# Patient Record
Sex: MALE | Race: WHITE | NOT HISPANIC OR LATINO | Employment: FULL TIME | URBAN - METROPOLITAN AREA
[De-identification: names, ages, dates, MRNs, and addresses within clinical notes are randomized per-mention and may not be internally consistent; named-entity substitution may affect disease eponyms.]

---

## 2019-10-01 PROBLEM — Z00.00 ENCOUNTER FOR GENERAL ADULT MEDICAL EXAMINATION WITHOUT ABNORMAL FINDINGS: Status: ACTIVE | Noted: 2018-05-16

## 2019-10-01 PROBLEM — D72.819 LEUKOPENIA: Status: ACTIVE | Noted: 2018-05-22

## 2019-10-03 ENCOUNTER — OFFICE VISIT (OUTPATIENT)
Dept: FAMILY MEDICINE CLINIC | Facility: CLINIC | Age: 54
End: 2019-10-03

## 2019-10-03 VITALS
HEIGHT: 76 IN | RESPIRATION RATE: 16 BRPM | BODY MASS INDEX: 25.33 KG/M2 | WEIGHT: 208 LBS | SYSTOLIC BLOOD PRESSURE: 142 MMHG | DIASTOLIC BLOOD PRESSURE: 85 MMHG | HEART RATE: 109 BPM | OXYGEN SATURATION: 99 % | TEMPERATURE: 98 F

## 2019-10-03 DIAGNOSIS — Z00.00 ENCOUNTER FOR GENERAL ADULT MEDICAL EXAMINATION WITHOUT ABNORMAL FINDINGS: Primary | ICD-10-CM

## 2019-10-03 DIAGNOSIS — R07.89 CHEST PAIN, ATYPICAL: ICD-10-CM

## 2019-10-03 DIAGNOSIS — Z23 NEED FOR IMMUNIZATION AGAINST INFLUENZA: ICD-10-CM

## 2019-10-03 LAB
25(OH)D3 SERPL-MCNC: 38.2 NG/ML (ref 30–100)
ALBUMIN SERPL-MCNC: 4.3 G/DL (ref 3.5–4.8)
ALBUMIN/GLOB SERPL: 1.6 G/DL (ref 1–1.7)
ALP SERPL-CCNC: 56 U/L (ref 32–91)
ALT SERPL W P-5'-P-CCNC: 35 U/L (ref 17–63)
ANION GAP SERPL CALCULATED.3IONS-SCNC: 13.4 MMOL/L (ref 5–15)
ARTICHOKE IGE QN: 150 MG/DL (ref 0–100)
AST SERPL-CCNC: 23 U/L (ref 15–41)
BASOPHILS # BLD AUTO: 0 10*3/MM3 (ref 0–0.2)
BASOPHILS NFR BLD AUTO: 0.7 % (ref 0–1.5)
BILIRUB SERPL-MCNC: 0.8 MG/DL (ref 0.3–1.2)
BILIRUB UR QL STRIP: ABNORMAL
BUN BLD-MCNC: 15 MG/DL (ref 8–20)
BUN/CREAT SERPL: 12.5 (ref 6.2–20.3)
CALCIUM SPEC-SCNC: 9.8 MG/DL (ref 8.9–10.3)
CHLORIDE SERPL-SCNC: 104 MMOL/L (ref 101–111)
CHOLEST SERPL-MCNC: 220 MG/DL
CLARITY UR: ABNORMAL
CO2 SERPL-SCNC: 27 MMOL/L (ref 22–32)
COLOR UR: ABNORMAL
CREAT BLD-MCNC: 1.2 MG/DL (ref 0.7–1.2)
DEPRECATED RDW RBC AUTO: 41.6 FL (ref 37–54)
EOSINOPHIL # BLD AUTO: 0.1 10*3/MM3 (ref 0–0.4)
EOSINOPHIL NFR BLD AUTO: 1.6 % (ref 0.3–6.2)
ERYTHROCYTE [DISTWIDTH] IN BLOOD BY AUTOMATED COUNT: 13.7 % (ref 12.3–15.4)
GFR SERPL CREATININE-BSD FRML MDRD: 63 ML/MIN/1.73
GLOBULIN UR ELPH-MCNC: 2.7 GM/DL (ref 2.5–3.8)
GLUCOSE BLD-MCNC: 92 MG/DL (ref 65–99)
GLUCOSE UR STRIP-MCNC: NEGATIVE MG/DL
HCT VFR BLD AUTO: 45.9 % (ref 37.5–51)
HDLC SERPL QL: 3.86
HDLC SERPL-MCNC: 57 MG/DL
HGB BLD-MCNC: 15.5 G/DL (ref 13–17.7)
HGB UR QL STRIP.AUTO: NEGATIVE
KETONES UR QL STRIP: NEGATIVE
LDLC/HDLC SERPL: 2.56 {RATIO}
LEUKOCYTE ESTERASE UR QL STRIP.AUTO: NEGATIVE
LYMPHOCYTES # BLD AUTO: 1.6 10*3/MM3 (ref 0.7–3.1)
LYMPHOCYTES NFR BLD AUTO: 28.4 % (ref 19.6–45.3)
MCH RBC QN AUTO: 29.3 PG (ref 26.6–33)
MCHC RBC AUTO-ENTMCNC: 33.8 G/DL (ref 31.5–35.7)
MCV RBC AUTO: 86.8 FL (ref 79–97)
MONOCYTES # BLD AUTO: 0.5 10*3/MM3 (ref 0.1–0.9)
MONOCYTES NFR BLD AUTO: 9.2 % (ref 5–12)
NEUTROPHILS # BLD AUTO: 3.4 10*3/MM3 (ref 1.7–7)
NEUTROPHILS NFR BLD AUTO: 60.1 % (ref 42.7–76)
NITRITE UR QL STRIP: NEGATIVE
NRBC BLD AUTO-RTO: 0.2 /100 WBC (ref 0–0.2)
PH UR STRIP.AUTO: 5.5 [PH] (ref 5–8)
PLATELET # BLD AUTO: 275 10*3/MM3 (ref 140–450)
PMV BLD AUTO: 10.3 FL (ref 6–12)
POTASSIUM BLD-SCNC: 4.4 MMOL/L (ref 3.6–5.1)
PROT SERPL-MCNC: 7 G/DL (ref 6.1–7.9)
PROT UR QL STRIP: NEGATIVE
PSA SERPL-MCNC: 1.66 NG/ML (ref 0–4)
RBC # BLD AUTO: 5.28 10*6/MM3 (ref 4.14–5.8)
SODIUM BLD-SCNC: 140 MMOL/L (ref 136–144)
SP GR UR STRIP: >=1.03 (ref 1–1.03)
TRIGL SERPL-MCNC: 86 MG/DL
TROPONIN I SERPL-MCNC: <0.03 NG/ML (ref 0–0.03)
TSH SERPL DL<=0.05 MIU/L-ACNC: 1.45 UIU/ML (ref 0.34–5.6)
UROBILINOGEN UR QL STRIP: ABNORMAL
VIT B12 BLD-MCNC: 160 PG/ML (ref 180–914)
VLDLC SERPL-MCNC: 17.2 MG/DL
WBC NRBC COR # BLD: 5.6 10*3/MM3 (ref 3.4–10.8)

## 2019-10-03 PROCEDURE — 82607 VITAMIN B-12: CPT | Performed by: FAMILY MEDICINE

## 2019-10-03 PROCEDURE — 81003 URINALYSIS AUTO W/O SCOPE: CPT | Performed by: FAMILY MEDICINE

## 2019-10-03 PROCEDURE — G0103 PSA SCREENING: HCPCS | Performed by: FAMILY MEDICINE

## 2019-10-03 PROCEDURE — 84443 ASSAY THYROID STIM HORMONE: CPT | Performed by: FAMILY MEDICINE

## 2019-10-03 PROCEDURE — 90471 IMMUNIZATION ADMIN: CPT | Performed by: FAMILY MEDICINE

## 2019-10-03 PROCEDURE — 80061 LIPID PANEL: CPT | Performed by: FAMILY MEDICINE

## 2019-10-03 PROCEDURE — 36415 COLL VENOUS BLD VENIPUNCTURE: CPT | Performed by: FAMILY MEDICINE

## 2019-10-03 PROCEDURE — 90674 CCIIV4 VAC NO PRSV 0.5 ML IM: CPT | Performed by: FAMILY MEDICINE

## 2019-10-03 PROCEDURE — 82306 VITAMIN D 25 HYDROXY: CPT | Performed by: FAMILY MEDICINE

## 2019-10-03 PROCEDURE — 84484 ASSAY OF TROPONIN QUANT: CPT | Performed by: FAMILY MEDICINE

## 2019-10-03 PROCEDURE — 93000 ELECTROCARDIOGRAM COMPLETE: CPT | Performed by: FAMILY MEDICINE

## 2019-10-03 PROCEDURE — 99396 PREV VISIT EST AGE 40-64: CPT | Performed by: FAMILY MEDICINE

## 2019-10-03 PROCEDURE — 80053 COMPREHEN METABOLIC PANEL: CPT | Performed by: FAMILY MEDICINE

## 2019-10-03 PROCEDURE — 85025 COMPLETE CBC W/AUTO DIFF WBC: CPT | Performed by: FAMILY MEDICINE

## 2019-10-03 RX ORDER — PREDNISONE 20 MG/1
20 TABLET ORAL DAILY
Qty: 10 TABLET | Refills: 0 | Status: SHIPPED | OUTPATIENT
Start: 2019-10-03 | End: 2020-06-01

## 2019-10-03 NOTE — PROGRESS NOTES
Subjective   Chief Complaint   Patient presents with   • Annual Exam     Steven Todd Sturgeon is a 54 y.o. male.     Patient Care Team:  Phyllis Frost MD as PCP - General (Family Medicine)    He is coming in today for his annual wellness exam.  He reports that for the last 2 weeks or so he has noted some numbness and tingling sensation on the left upper chest, which happens only at night with certain position of the shoulder and arm.  He has tried over-the-counter anti-inflammatories and those are giving him relief.  Today he did not take any and he feels the sensation lipid more.  He denies any chest pains or shortness of breath.  He denies any dizziness, nausea, vomiting, diarrhea, or constipation.  He has pretty healthy lifestyle and stays pretty active.  He is trying to eat healthy.  He denies any family history of heart issues, besides in his father later in life however he was a heavy smoker for GERD for quite some time.  He denies any urinary problems.  He does not take any medications.         The following portions of the patient's history were reviewed and updated as appropriate: allergies, current medications, past family history, past medical history, past social history, past surgical history and problem list.  Past Medical History:   Diagnosis Date   • Hyperlipidemia      Past Surgical History:   Procedure Laterality Date   • COLONOSCOPY      refusing; negative cologuard 6/4/2018   • TONSILLECTOMY     • VOCAL CORD STRIPPING       The patient has a family history of  Family History   Problem Relation Age of Onset   • COPD Father    • Hypertension Father    • Hyperlipidemia Father    • Diabetes Father    • Heart disease Father      Social History     Socioeconomic History   • Marital status:      Spouse name: Not on file   • Number of children: Not on file   • Years of education: Not on file   • Highest education level: Not on file   Tobacco Use   • Smoking status: Never Smoker   • Smokeless  "tobacco: Never Used   Substance and Sexual Activity   • Alcohol use: No     Frequency: Never   • Drug use: No   • Sexual activity: Defer       Review of Systems   Constitutional: Negative for activity change, appetite change, chills, fatigue, fever, unexpected weight gain and unexpected weight loss.   HENT: Negative for congestion, ear pain, hearing loss, nosebleeds, postnasal drip, swollen glands, tinnitus and trouble swallowing.    Eyes: Negative for blurred vision, double vision and visual disturbance.   Respiratory: Negative for cough, choking, chest tightness, shortness of breath, wheezing and stridor.    Cardiovascular: Negative for chest pain, palpitations and leg swelling.        He reports some atypical chest sensations.   Gastrointestinal: Negative for abdominal distention, abdominal pain, anal bleeding, constipation, diarrhea, nausea, vomiting and GERD.   Endocrine: Negative for cold intolerance, heat intolerance and polyphagia.   Genitourinary: Negative for dysuria, flank pain, frequency, hematuria and urgency.   Musculoskeletal: Negative for arthralgias, back pain, gait problem, joint swelling and neck pain.   Skin: Negative for color change, dry skin and skin lesions.   Allergic/Immunologic: Negative for environmental allergies and food allergies.   Neurological: Negative for dizziness, tremors, speech difficulty, light-headedness, numbness, headache and confusion.   Hematological: Negative for adenopathy. Does not bruise/bleed easily.   Psychiatric/Behavioral: Negative for decreased concentration, sleep disturbance, depressed mood and stress.     Visit Vitals  /85 (BP Location: Right arm, Patient Position: Sitting, Cuff Size: Adult)   Pulse 109   Temp 98 °F (36.7 °C) (Oral)   Resp 16   Ht 193 cm (76\")   Wt 94.3 kg (208 lb)   SpO2 99%   BMI 25.32 kg/m²       Current Outpatient Medications:   •  predniSONE (DELTASONE) 20 MG tablet, Take 1 tablet by mouth Daily., Disp: 10 tablet, Rfl: " 0    Objective   Physical Exam   Constitutional: He is oriented to person, place, and time. He appears well-developed and well-nourished. No distress.   HENT:   Head: Normocephalic and atraumatic.   Right Ear: External ear normal.   Left Ear: External ear normal.   Mouth/Throat: Oropharynx is clear and moist.   Eyes: Conjunctivae and EOM are normal. Pupils are equal, round, and reactive to light. Right eye exhibits no discharge. Left eye exhibits no discharge. No scleral icterus.   Neck: Normal range of motion. Neck supple. No JVD present. No thyromegaly present.   Cardiovascular: Normal rate, regular rhythm, normal heart sounds and intact distal pulses. Exam reveals no gallop and no friction rub.   No murmur heard.  Pulmonary/Chest: Effort normal and breath sounds normal. No respiratory distress. He has no wheezes. He has no rales.   Abdominal: Soft. Bowel sounds are normal. He exhibits no distension and no mass. There is no tenderness. There is no rebound and no guarding. No hernia.   Musculoskeletal: Normal range of motion. He exhibits no edema, tenderness or deformity.   Lymphadenopathy:     He has no cervical adenopathy.   Neurological: He is alert and oriented to person, place, and time. He displays normal reflexes. No cranial nerve deficit or sensory deficit.   Skin: Skin is warm and dry. Capillary refill takes less than 2 seconds. No rash noted. He is not diaphoretic. No erythema. No pallor.   Psychiatric: He has a normal mood and affect. His behavior is normal. Judgment normal.   Nursing note and vitals reviewed.        ECG 12 Lead  Date/Time: 10/3/2019 10:28 AM  Performed by: Phyllis Frost MD  Authorized by: Phyllis Frost MD   Comparison: not compared with previous ECG   Rhythm: sinus rhythm  Rate: normal  Conduction: conduction normal  ST Segments: ST segments normal  T Waves: T waves normal  QRS axis: normal  Other: no other findings    Clinical impression: normal ECG            Assessment/Plan    Problems Addressed this Visit        Other    Encounter for general adult medical examination without abnormal findings - Primary    Relevant Orders    CBC Auto Differential    Comprehensive Metabolic Panel    Lipid Panel    PSA Screen    Urinalysis With Culture If Indicated - Urine, Clean Catch    Vitamin B12    Vitamin D 25 Hydroxy    TSH      Other Visit Diagnoses     Chest pain, atypical        Relevant Orders    ECG 12 Lead    Troponin    Need for immunization against influenza        Relevant Orders    Flucelvax Quad=>4Years ([id number]) (Completed)        Wellness exam was done today - see above for details. Healthy life style was reviewed and discussed and re-enforced. Regular exercise and healthy diet were also discussed and recommended.  I will be getting fasting blood work.  If it comes to his chest sensations does seem to be pretty atypical and sounds more like radiculopathy.  EKG was done today and it did not show any acute changes.  I will start him on a course of the steroid to see if that will give him the relief.  He was advised to call us back if symptoms continue.  He has never had colonoscopy due to refusal, but he had negative Cologuard and June 2018.  He was given a flu shot today.  We will also discussed Shingrix, which was recommended and he is to check with his pharmacy.               Requested Prescriptions     Signed Prescriptions Disp Refills   • predniSONE (DELTASONE) 20 MG tablet 10 tablet 0     Sig: Take 1 tablet by mouth Daily.

## 2019-10-14 ENCOUNTER — CLINICAL SUPPORT (OUTPATIENT)
Dept: FAMILY MEDICINE CLINIC | Facility: CLINIC | Age: 54
End: 2019-10-14

## 2019-10-14 DIAGNOSIS — E53.8 B12 DEFICIENCY: Primary | ICD-10-CM

## 2019-10-14 PROCEDURE — 96372 THER/PROPH/DIAG INJ SC/IM: CPT | Performed by: FAMILY MEDICINE

## 2019-10-14 RX ORDER — CYANOCOBALAMIN 1000 UG/ML
1000 INJECTION, SOLUTION INTRAMUSCULAR; SUBCUTANEOUS ONCE
Status: COMPLETED | OUTPATIENT
Start: 2019-10-14 | End: 2019-10-14

## 2019-10-14 RX ADMIN — CYANOCOBALAMIN 1000 MCG: 1000 INJECTION, SOLUTION INTRAMUSCULAR; SUBCUTANEOUS at 10:48

## 2019-10-15 ENCOUNTER — CLINICAL SUPPORT (OUTPATIENT)
Dept: FAMILY MEDICINE CLINIC | Facility: CLINIC | Age: 54
End: 2019-10-15

## 2019-10-15 DIAGNOSIS — E53.8 B12 DEFICIENCY: Primary | ICD-10-CM

## 2019-10-15 PROCEDURE — 96372 THER/PROPH/DIAG INJ SC/IM: CPT | Performed by: FAMILY MEDICINE

## 2019-10-15 RX ORDER — CYANOCOBALAMIN 1000 UG/ML
1000 INJECTION, SOLUTION INTRAMUSCULAR; SUBCUTANEOUS ONCE
Status: COMPLETED | OUTPATIENT
Start: 2019-10-15 | End: 2019-10-15

## 2019-10-15 RX ADMIN — CYANOCOBALAMIN 1000 MCG: 1000 INJECTION, SOLUTION INTRAMUSCULAR; SUBCUTANEOUS at 10:58

## 2019-10-16 ENCOUNTER — CLINICAL SUPPORT (OUTPATIENT)
Dept: FAMILY MEDICINE CLINIC | Facility: CLINIC | Age: 54
End: 2019-10-16

## 2019-10-16 DIAGNOSIS — E53.8 B12 DEFICIENCY: Primary | ICD-10-CM

## 2019-10-16 PROCEDURE — 96372 THER/PROPH/DIAG INJ SC/IM: CPT | Performed by: FAMILY MEDICINE

## 2019-10-16 RX ORDER — CYANOCOBALAMIN 1000 UG/ML
1000 INJECTION, SOLUTION INTRAMUSCULAR; SUBCUTANEOUS ONCE
Status: COMPLETED | OUTPATIENT
Start: 2019-10-16 | End: 2019-10-16

## 2019-10-16 RX ADMIN — CYANOCOBALAMIN 1000 MCG: 1000 INJECTION, SOLUTION INTRAMUSCULAR; SUBCUTANEOUS at 11:55

## 2019-10-23 ENCOUNTER — CLINICAL SUPPORT (OUTPATIENT)
Dept: FAMILY MEDICINE CLINIC | Facility: CLINIC | Age: 54
End: 2019-10-23

## 2019-10-23 DIAGNOSIS — E53.8 B12 DEFICIENCY: Primary | ICD-10-CM

## 2019-10-23 PROCEDURE — 96372 THER/PROPH/DIAG INJ SC/IM: CPT | Performed by: FAMILY MEDICINE

## 2019-10-23 RX ORDER — CYANOCOBALAMIN 1000 UG/ML
1000 INJECTION, SOLUTION INTRAMUSCULAR; SUBCUTANEOUS ONCE
Status: COMPLETED | OUTPATIENT
Start: 2019-10-23 | End: 2019-10-23

## 2019-10-23 RX ADMIN — CYANOCOBALAMIN 1000 MCG: 1000 INJECTION, SOLUTION INTRAMUSCULAR; SUBCUTANEOUS at 10:58

## 2019-10-30 ENCOUNTER — CLINICAL SUPPORT (OUTPATIENT)
Dept: FAMILY MEDICINE CLINIC | Facility: CLINIC | Age: 54
End: 2019-10-30

## 2019-10-30 DIAGNOSIS — E53.8 B12 DEFICIENCY: Primary | ICD-10-CM

## 2019-10-30 PROCEDURE — 96372 THER/PROPH/DIAG INJ SC/IM: CPT | Performed by: FAMILY MEDICINE

## 2019-10-30 RX ORDER — CYANOCOBALAMIN 1000 UG/ML
1000 INJECTION, SOLUTION INTRAMUSCULAR; SUBCUTANEOUS ONCE
Status: COMPLETED | OUTPATIENT
Start: 2019-10-30 | End: 2019-10-30

## 2019-10-30 RX ADMIN — CYANOCOBALAMIN 1000 MCG: 1000 INJECTION, SOLUTION INTRAMUSCULAR; SUBCUTANEOUS at 11:03

## 2020-06-01 ENCOUNTER — OFFICE VISIT (OUTPATIENT)
Dept: FAMILY MEDICINE CLINIC | Facility: CLINIC | Age: 55
End: 2020-06-01

## 2020-06-01 VITALS
TEMPERATURE: 97.3 F | DIASTOLIC BLOOD PRESSURE: 76 MMHG | SYSTOLIC BLOOD PRESSURE: 146 MMHG | HEIGHT: 76 IN | HEART RATE: 91 BPM | OXYGEN SATURATION: 97 % | BODY MASS INDEX: 26.18 KG/M2 | RESPIRATION RATE: 16 BRPM | WEIGHT: 215 LBS

## 2020-06-01 DIAGNOSIS — R07.89 OTHER CHEST PAIN: Primary | ICD-10-CM

## 2020-06-01 DIAGNOSIS — M25.512 CHRONIC LEFT SHOULDER PAIN: ICD-10-CM

## 2020-06-01 DIAGNOSIS — G89.29 CHRONIC LEFT SHOULDER PAIN: ICD-10-CM

## 2020-06-01 DIAGNOSIS — E53.8 VITAMIN B12 DEFICIENCY: ICD-10-CM

## 2020-06-01 PROCEDURE — 85025 COMPLETE CBC W/AUTO DIFF WBC: CPT | Performed by: FAMILY MEDICINE

## 2020-06-01 PROCEDURE — 84484 ASSAY OF TROPONIN QUANT: CPT | Performed by: FAMILY MEDICINE

## 2020-06-01 PROCEDURE — 80053 COMPREHEN METABOLIC PANEL: CPT | Performed by: FAMILY MEDICINE

## 2020-06-01 PROCEDURE — 36415 COLL VENOUS BLD VENIPUNCTURE: CPT | Performed by: FAMILY MEDICINE

## 2020-06-01 PROCEDURE — 82607 VITAMIN B-12: CPT | Performed by: FAMILY MEDICINE

## 2020-06-01 PROCEDURE — 93000 ELECTROCARDIOGRAM COMPLETE: CPT | Performed by: FAMILY MEDICINE

## 2020-06-01 PROCEDURE — 99214 OFFICE O/P EST MOD 30 MIN: CPT | Performed by: FAMILY MEDICINE

## 2020-06-01 RX ORDER — SILDENAFIL CITRATE 20 MG/1
TABLET ORAL
COMMUNITY
Start: 2020-05-15

## 2020-06-01 RX ORDER — METHYLPREDNISOLONE 4 MG/1
TABLET ORAL
Qty: 1 EACH | Refills: 0 | Status: SHIPPED | OUTPATIENT
Start: 2020-06-01

## 2020-06-01 NOTE — PROGRESS NOTES
Subjective   Chief Complaint   Patient presents with   • Chest Pain   • Shoulder Pain     Steven Todd Sturgeon is a 55 y.o. male.     Patient Care Team:  Phyllis Frost MD as PCP - General (Family Medicine)    He is coming in today due to on and off chest pain and left shoulder pain, which she has been dealing with for over 6 months.  He reports that the pain is in the left upper chest and feels like discomfort and gets actually aggravated with certain position of his arm and shoulder.  He noted more discomfort at night when he lies on the left side and moves the shoulder and arm in certain direction.  He denies any numbness or tingling.  He denies any trauma.  He denies any difficulty breathing.  He is still able to do all what he wants to do physically and does not have to stop due to increased chest or shoulder pain.  He also wants to talk about his not very healthy lifestyle, which he has been having for the last several months.  He has gained some weight in a week ago he decided to start working more on his diet.  He started eating healthier and stop drinking alcohol altogether.  Since then he has lost 4 pounds and he actually feels much better than he did ever before.  He denies any rashes in the area.  He denies any nausea, vomiting, or diarrhea.  He denies any fevers.  He reports that his uncle when he was about his age sustained a stroke and that really is concerning to him and he wants to be checked.       The following portions of the patient's history were reviewed and updated as appropriate: allergies, current medications, past family history, past medical history, past social history, past surgical history and problem list.  Past Medical History:   Diagnosis Date   • Hyperlipidemia    • Vitamin B12 deficiency      Past Surgical History:   Procedure Laterality Date   • COLONOSCOPY      refusing; negative cologuard 6/4/2018   • TONSILLECTOMY     • VOCAL CORD STRIPPING       The patient has a family  "history of  Family History   Problem Relation Age of Onset   • COPD Father    • Hypertension Father    • Hyperlipidemia Father    • Diabetes Father    • Heart disease Father      Social History     Socioeconomic History   • Marital status:      Spouse name: Not on file   • Number of children: Not on file   • Years of education: Not on file   • Highest education level: Not on file   Tobacco Use   • Smoking status: Never Smoker   • Smokeless tobacco: Never Used   Substance and Sexual Activity   • Alcohol use: No     Frequency: Never   • Drug use: No   • Sexual activity: Defer       Review of Systems   Constitutional: Negative for activity change, fatigue and fever.   Respiratory: Positive for chest tightness. Negative for cough, shortness of breath and wheezing.    Cardiovascular: Positive for chest pain. Negative for palpitations and leg swelling.   Gastrointestinal: Negative for constipation, diarrhea and indigestion.   Musculoskeletal: Positive for arthralgias. Negative for back pain, gait problem, joint swelling and bursitis.   Skin: Negative for color change, dry skin and rash.   Neurological: Negative for tremors and headache.     Visit Vitals  /76 (BP Location: Left arm, Patient Position: Sitting, Cuff Size: Large Adult)   Pulse 91   Temp 97.3 °F (36.3 °C) (Temporal)   Resp 16   Ht 193 cm (76\")   Wt 97.5 kg (215 lb)   SpO2 97%   BMI 26.17 kg/m²       Current Outpatient Medications:   •  methylPREDNISolone (Medrol) 4 MG tablet, Take as directed on package instructions., Disp: 1 each, Rfl: 0  •  sildenafil (REVATIO) 20 MG tablet, Take 3 tablets by mouth one hour prior to sex as needed. Do not take more than one dose every 24 hours., Disp: , Rfl:     Objective   Physical Exam   Constitutional: He is oriented to person, place, and time. He appears well-developed and well-nourished.   HENT:   Head: Normocephalic and atraumatic.   Eyes: Pupils are equal, round, and reactive to light. Conjunctivae and EOM " are normal.   Neck: Normal range of motion. Neck supple.   Cardiovascular: Normal rate, regular rhythm, normal heart sounds and intact distal pulses. Exam reveals no gallop.   No murmur heard.  Pulmonary/Chest: Effort normal and breath sounds normal. No stridor. No respiratory distress. He has no wheezes. He has no rales. He exhibits no tenderness.   Abdominal: Soft. Bowel sounds are normal.   Musculoskeletal: Normal range of motion. He exhibits no edema or deformity.   Neurological: He is alert and oriented to person, place, and time.   Skin: Skin is warm and dry.   Nursing note and vitals reviewed.           No visits with results within 7 Day(s) from this visit.   Latest known visit with results is:   Office Visit on 10/03/2019   Component Date Value Ref Range Status   • WBC 10/03/2019 5.60  3.40 - 10.80 10*3/mm3 Final   • RBC 10/03/2019 5.28  4.14 - 5.80 10*6/mm3 Final   • Hemoglobin 10/03/2019 15.5  13.0 - 17.7 g/dL Final   • Hematocrit 10/03/2019 45.9  37.5 - 51.0 % Final   • MCV 10/03/2019 86.8  79.0 - 97.0 fL Final   • MCH 10/03/2019 29.3  26.6 - 33.0 pg Final   • MCHC 10/03/2019 33.8  31.5 - 35.7 g/dL Final   • RDW 10/03/2019 13.7  12.3 - 15.4 % Final   • RDW-SD 10/03/2019 41.6  37.0 - 54.0 fl Final   • MPV 10/03/2019 10.3  6.0 - 12.0 fL Final   • Platelets 10/03/2019 275  140 - 450 10*3/mm3 Final   • Neutrophil % 10/03/2019 60.1  42.7 - 76.0 % Final   • Lymphocyte % 10/03/2019 28.4  19.6 - 45.3 % Final   • Monocyte % 10/03/2019 9.2  5.0 - 12.0 % Final   • Eosinophil % 10/03/2019 1.6  0.3 - 6.2 % Final   • Basophil % 10/03/2019 0.7  0.0 - 1.5 % Final   • Neutrophils, Absolute 10/03/2019 3.40  1.70 - 7.00 10*3/mm3 Final   • Lymphocytes, Absolute 10/03/2019 1.60  0.70 - 3.10 10*3/mm3 Final   • Monocytes, Absolute 10/03/2019 0.50  0.10 - 0.90 10*3/mm3 Final   • Eosinophils, Absolute 10/03/2019 0.10  0.00 - 0.40 10*3/mm3 Final   • Basophils, Absolute 10/03/2019 0.00  0.00 - 0.20 10*3/mm3 Final   • nRBC  10/03/2019 0.2  0.0 - 0.2 /100 WBC Final   • Glucose 10/03/2019 92  65 - 99 mg/dL Final   • BUN 10/03/2019 15  8 - 20 mg/dL Final   • Creatinine 10/03/2019 1.20  0.70 - 1.20 mg/dL Final   • Sodium 10/03/2019 140  136 - 144 mmol/L Final   • Potassium 10/03/2019 4.4  3.6 - 5.1 mmol/L Final   • Chloride 10/03/2019 104  101 - 111 mmol/L Final   • CO2 10/03/2019 27.0  22.0 - 32.0 mmol/L Final   • Calcium 10/03/2019 9.8  8.9 - 10.3 mg/dL Final   • Total Protein 10/03/2019 7.0  6.1 - 7.9 g/dL Final   • Albumin 10/03/2019 4.30  3.50 - 4.80 g/dL Final   • ALT (SGPT) 10/03/2019 35  17 - 63 U/L Final   • AST (SGOT) 10/03/2019 23  15 - 41 U/L Final   • Alkaline Phosphatase 10/03/2019 56  32 - 91 U/L Final   • Total Bilirubin 10/03/2019 0.8  0.3 - 1.2 mg/dL Final   • eGFR Non African Amer 10/03/2019 63  >60 mL/min/1.73 Final   • Globulin 10/03/2019 2.7  2.5 - 3.8 gm/dL Final   • A/G Ratio 10/03/2019 1.6  1.0 - 1.7 g/dL Final   • BUN/Creatinine Ratio 10/03/2019 12.5  6.2 - 20.3 Final   • Anion Gap 10/03/2019 13.4  5.0 - 15.0 mmol/L Final   • Total Cholesterol 10/03/2019 220* <=200 mg/dL Final   • Triglycerides 10/03/2019 86  <=150 mg/dL Final   • HDL Cholesterol 10/03/2019 57  >=39 mg/dL Final   • LDL Cholesterol  10/03/2019 150* 0 - 100 mg/dL Final   • VLDL Cholesterol 10/03/2019 17.2  mg/dL Final   • LDL/HDL Ratio 10/03/2019 2.56   Final   • Chol/HDL Ratio 10/03/2019 3.86   Final   • PSA 10/03/2019 1.660  0.000 - 4.000 ng/mL Final    Results may be falsely decreased if patient taking Biotin.   • Color, UA 10/03/2019 Dark Yellow* Yellow, Straw Final   • Appearance, UA 10/03/2019 Turbid* Clear Final    Result checked    • pH, UA 10/03/2019 5.5  5.0 - 8.0 Final   • Specific Gravity, UA 10/03/2019 >=1.030  1.005 - 1.030 Final   • Glucose, UA 10/03/2019 Negative  Negative Final   • Ketones, UA 10/03/2019 Negative  Negative Final   • Bilirubin, UA 10/03/2019 Small (1+)* Negative Final    Confirmation testing is unavailable.  A serum  bilirubin is recommended for further assessment.   • Blood, UA 10/03/2019 Negative  Negative Final   • Protein, UA 10/03/2019 Negative  Negative Final   • Leuk Esterase, UA 10/03/2019 Negative  Negative Final   • Nitrite, UA 10/03/2019 Negative  Negative Final   • Urobilinogen, UA 10/03/2019 0.2 E.U./dL  0.2 - 1.0 E.U./dL Final   • Vitamin B-12 10/03/2019 160* 180 - 914 pg/mL Final    Results may be falsely increased if patient taking Biotin.   • 25 Hydroxy, Vitamin D 10/03/2019 38.2  30.0 - 100.0 ng/ml Final   • TSH 10/03/2019 1.450  0.340 - 5.600 uIU/mL Final    Results may be falsely decreased if patient taking Biotin.   • Troponin I 10/03/2019 <0.030  0.000 - 0.030 ng/mL Final               ECG 12 Lead  Date/Time: 6/1/2020 2:20 PM  Performed by: Phyllis Frost MD  Authorized by: Phyllis Frost MD   Comparison: compared with previous ECG from 10/3/2020  Similar to previous ECG  Rhythm: sinus rhythm  Rate: normal  Conduction: conduction normal  ST Segments: ST segments normal  T Waves: T waves normal  QRS axis: normal  Other: no other findings    Clinical impression: normal ECG              Assessment/Plan   Problems Addressed this Visit        Digestive    Vitamin B12 deficiency    Relevant Orders    Vitamin B12       Nervous and Auditory    Other chest pain - Primary    Relevant Orders    Ambulatory Referral to Cardiology    CBC Auto Differential    Comprehensive Metabolic Panel    Troponin    ECG 12 Lead    Chronic left shoulder pain        Patient was evaluated in person in the office today.  I reviewed his symptoms and concerns.  EKG was done today and did not show any acute changes.  It was compared to EKG done in October in our clinic of last year and no changes noted.  His symptoms definitely are pretty atypical and I doubt cardiac origin.  His symptoms are more consistent with possible radiculopathy or tendinitis.  However I will be getting blood work including troponin as of course atypical  symptoms of cardiac issues are possible.  I will be starting him on Medrol Dosepak to see if this will give him the relief.  I also reviewed with him and discussed blood work results from October 2019 and at that time his vitamin B12 level was pretty low.  He was on B12 shots for some time and now he is on oral vitamin B supplementation.  I will be rechecking that as a part of the blood work today as well.             Requested Prescriptions     Signed Prescriptions Disp Refills   • methylPREDNISolone (Medrol) 4 MG tablet 1 each 0     Sig: Take as directed on package instructions.

## 2020-06-02 LAB
ALBUMIN SERPL-MCNC: 4.6 G/DL (ref 3.5–5.2)
ALBUMIN/GLOB SERPL: 1.5 G/DL
ALP SERPL-CCNC: 58 U/L (ref 39–117)
ALT SERPL W P-5'-P-CCNC: 64 U/L (ref 1–41)
ANION GAP SERPL CALCULATED.3IONS-SCNC: 9.5 MMOL/L (ref 5–15)
AST SERPL-CCNC: 35 U/L (ref 1–40)
BASOPHILS # BLD AUTO: 0.04 10*3/MM3 (ref 0–0.2)
BASOPHILS NFR BLD AUTO: 0.7 % (ref 0–1.5)
BILIRUB SERPL-MCNC: 0.6 MG/DL (ref 0.2–1.2)
BUN BLD-MCNC: 22 MG/DL (ref 6–20)
BUN/CREAT SERPL: 19.1 (ref 7–25)
CALCIUM SPEC-SCNC: 9.9 MG/DL (ref 8.6–10.5)
CHLORIDE SERPL-SCNC: 99 MMOL/L (ref 98–107)
CO2 SERPL-SCNC: 25.5 MMOL/L (ref 22–29)
CREAT BLD-MCNC: 1.15 MG/DL (ref 0.76–1.27)
DEPRECATED RDW RBC AUTO: 40.6 FL (ref 37–54)
EOSINOPHIL # BLD AUTO: 0.05 10*3/MM3 (ref 0–0.4)
EOSINOPHIL NFR BLD AUTO: 0.9 % (ref 0.3–6.2)
ERYTHROCYTE [DISTWIDTH] IN BLOOD BY AUTOMATED COUNT: 13 % (ref 12.3–15.4)
GFR SERPL CREATININE-BSD FRML MDRD: 66 ML/MIN/1.73
GLOBULIN UR ELPH-MCNC: 3 GM/DL
GLUCOSE BLD-MCNC: 93 MG/DL (ref 65–99)
HCT VFR BLD AUTO: 43.9 % (ref 37.5–51)
HGB BLD-MCNC: 15.2 G/DL (ref 13–17.7)
IMM GRANULOCYTES # BLD AUTO: 0.01 10*3/MM3 (ref 0–0.05)
IMM GRANULOCYTES NFR BLD AUTO: 0.2 % (ref 0–0.5)
LYMPHOCYTES # BLD AUTO: 1.7 10*3/MM3 (ref 0.7–3.1)
LYMPHOCYTES NFR BLD AUTO: 29.5 % (ref 19.6–45.3)
MCH RBC QN AUTO: 29.9 PG (ref 26.6–33)
MCHC RBC AUTO-ENTMCNC: 34.6 G/DL (ref 31.5–35.7)
MCV RBC AUTO: 86.4 FL (ref 79–97)
MONOCYTES # BLD AUTO: 0.58 10*3/MM3 (ref 0.1–0.9)
MONOCYTES NFR BLD AUTO: 10.1 % (ref 5–12)
NEUTROPHILS # BLD AUTO: 3.38 10*3/MM3 (ref 1.7–7)
NEUTROPHILS NFR BLD AUTO: 58.6 % (ref 42.7–76)
NRBC BLD AUTO-RTO: 0 /100 WBC (ref 0–0.2)
PLATELET # BLD AUTO: 303 10*3/MM3 (ref 140–450)
PMV BLD AUTO: 11.8 FL (ref 6–12)
POTASSIUM BLD-SCNC: 3.9 MMOL/L (ref 3.5–5.2)
PROT SERPL-MCNC: 7.6 G/DL (ref 6–8.5)
RBC # BLD AUTO: 5.08 10*6/MM3 (ref 4.14–5.8)
SODIUM BLD-SCNC: 134 MMOL/L (ref 136–145)
TROPONIN T SERPL-MCNC: <0.01 NG/ML (ref 0–0.03)
VIT B12 BLD-MCNC: 943 PG/ML (ref 211–946)
WBC NRBC COR # BLD: 5.76 10*3/MM3 (ref 3.4–10.8)

## 2020-06-16 ENCOUNTER — OFFICE VISIT (OUTPATIENT)
Dept: CARDIOLOGY | Facility: CLINIC | Age: 55
End: 2020-06-16

## 2020-06-16 VITALS
HEIGHT: 77 IN | SYSTOLIC BLOOD PRESSURE: 130 MMHG | OXYGEN SATURATION: 98 % | DIASTOLIC BLOOD PRESSURE: 81 MMHG | WEIGHT: 213 LBS | BODY MASS INDEX: 25.15 KG/M2 | HEART RATE: 86 BPM

## 2020-06-16 DIAGNOSIS — R06.09 DYSPNEA ON EXERTION: ICD-10-CM

## 2020-06-16 DIAGNOSIS — R74.01 ELEVATED ALANINE AMINOTRANSFERASE (ALT) LEVEL: ICD-10-CM

## 2020-06-16 DIAGNOSIS — R07.2 PRECORDIAL PAIN: Primary | ICD-10-CM

## 2020-06-16 DIAGNOSIS — E78.5 DYSLIPIDEMIA: ICD-10-CM

## 2020-06-16 DIAGNOSIS — R53.83 FATIGUE, UNSPECIFIED TYPE: ICD-10-CM

## 2020-06-16 PROCEDURE — 99204 OFFICE O/P NEW MOD 45 MIN: CPT | Performed by: INTERNAL MEDICINE

## 2020-06-16 RX ORDER — LANOLIN ALCOHOL/MO/W.PET/CERES
1000 CREAM (GRAM) TOPICAL DAILY
COMMUNITY

## 2020-06-16 NOTE — PROGRESS NOTES
Cardiology Consult Note    Patient Identification:  Name: Steven Todd Sturgeon  Age: 55 y.o.  Sex: male  :  1965  MRN: [id number]             Requesting Physician : Dr. Antonio    Reason for Consultation / Chief Complaint : patient co-management chest pain, CAD    History of Present Illness:      This is a 55-year-old with PMH of    -Hyperlipidemia  -B12 deficiency  -Tonsillectomy and vocal cord surgery  -Family history of heart disease in father and stroke at a young age and maternal uncle    Here for evaluation of chest pain.  Patient has chest pain which she calls a discomfort on the left side of the chest has been going on for last 9 months with sometimes exertional component.  Has been noticing progressively worsening fatigue, tires easier.  Has been having dyspnea on exertion with stairs relieved with rest.  Labs done 2020 reveal CMP with a ALT elevated at 64 AST 35.  Cholesterol from 10/3/2019 reveals total cholesterol 220 triglycerides 86 HDL 57  CMP unremarkable at that time.  EKG done 2020 reveals sinus rhythm at the rate of 86 bpm      Assessment:      -Recurrent left-sided chest pain  -Dyspnea on exertion  -elevated ALT  -Fatigue    Recommendations / Plan:        Reviewed lab results and EKG results with patient  We will schedule a treadmill and an echo to evaluate chest pain and dyspnea  We will follow-up and consider further evaluation and treatment  Advised patient to follow-up with PMD for elevated LFTs           Diagnosis Plan   1. Precordial pain  Adult Transthoracic Echo Complete W/ Cont if Necessary Per Protocol    Treadmill Stress Test   2. Dyspnea on exertion  Adult Transthoracic Echo Complete W/ Cont if Necessary Per Protocol    Treadmill Stress Test   3. Fatigue, unspecified type  Adult Transthoracic Echo Complete W/ Cont if Necessary Per Protocol    Treadmill Stress Test   4. Elevated alanine aminotransferase (ALT) level  Adult Transthoracic Echo Complete W/ Cont  if Necessary Per Protocol    Treadmill Stress Test   5. Dyslipidemia  Adult Transthoracic Echo Complete W/ Cont if Necessary Per Protocol    Treadmill Stress Test              Past Medical History:  Past Medical History:   Diagnosis Date   • Hyperlipidemia    • Vitamin B12 deficiency      Past Surgical History:  Past Surgical History:   Procedure Laterality Date   • COLONOSCOPY      refusing; negative cologuard 6/4/2018   • TONSILLECTOMY     • VASECTOMY     • VOCAL CORD STRIPPING        Allergies:  No Known Allergies  Home Meds:  Current Meds:     Current Outpatient Medications:   •  vitamin B-12 (CYANOCOBALAMIN) 1000 MCG tablet, Take 1,000 mcg by mouth Daily., Disp: , Rfl:   •  methylPREDNISolone (Medrol) 4 MG tablet, Take as directed on package instructions., Disp: 1 each, Rfl: 0  •  sildenafil (REVATIO) 20 MG tablet, Take 3 tablets by mouth one hour prior to sex as needed. Do not take more than one dose every 24 hours., Disp: , Rfl:   Social History:   Social History     Tobacco Use   • Smoking status: Never Smoker   • Smokeless tobacco: Never Used   Substance Use Topics   • Alcohol use: No     Frequency: Never      Family History:  Family History   Problem Relation Age of Onset   • COPD Father    • Hypertension Father    • Hyperlipidemia Father    • Diabetes Father    • Heart disease Father    • Stroke Maternal Uncle    • Heart disease Paternal Uncle    • Heart disease Paternal Uncle         Review of Systems : Review of Systems   Constitution: Positive for malaise/fatigue. Negative for weight gain and weight loss.   HENT: Negative for nosebleeds.    Eyes: Positive for blurred vision. Negative for double vision.   Cardiovascular: Positive for chest pain and dyspnea on exertion. Negative for leg swelling, near-syncope, palpitations and syncope.   Respiratory: Positive for shortness of breath. Negative for cough and hemoptysis.    Endocrine: Negative for cold intolerance, heat intolerance, polydipsia and  "polyphagia.   Hematologic/Lymphatic: Does not bruise/bleed easily.   Skin: Negative for rash.   Musculoskeletal: Negative for arthritis and back pain.   Gastrointestinal: Negative for diarrhea, heartburn, hematochezia, melena, nausea and vomiting.   Genitourinary: Negative for dysuria and hematuria.   Neurological: Negative for dizziness, light-headedness and seizures.   Psychiatric/Behavioral: Negative for altered mental status. The patient is not nervous/anxious.                Constitutional:  Heart Rate:  [86] 86  BP: (130)/(81) 130/81    Physical Exam   /81   Pulse 86   Ht 195.6 cm (77\")   Wt 96.6 kg (213 lb)   SpO2 98%   BMI 25.26 kg/m²   Physical Exam  General:  Appears in no acute distress  Eyes: Sclera is anicteric,  conjunctiva is clear   HEENT:  No JVD. Thyroid not visibly enlarged. No mucosal pallor or cyanosis  Respiratory: Respirations regular and unlabored at rest.  Bilaterally good breath sounds, with good air entry in all fields. No crackles, rubs or wheezes auscultated  Cardiovascular: S1,S2 Regular rate and rhythm. No murmur, rub or gallop auscultated. No pretibial pitting edema  Gastrointestinal: Abdomen soft, flat, non tender. Bowel sounds present.   Musculoskeletal:  No abnormal movements  Extremities: No digital clubbing or cyanosis  Skin: Color pink. Skin warm and dry to touch. No rashes  No xanthoma  Neuro: Alert and awake, no lateralizing deficits appreciated    Cardiographics  ECG: EKG tracing was  personally reviewed by me  From 1/1/2020 reveals sinus rhythm with rate of 86 bpm       Imaging  Chest X-ray:   Imaging Results (Last 24 Hours)     ** No results found for the last 24 hours. **          Lab Review: I have reviewed the labs              @[email or web address]                  Agustín Baer MD  6/16/2020, 15:03      EMR Dragon/Transcription:   Dictated utilizing Dragon dictation  "

## 2020-07-02 ENCOUNTER — HOSPITAL ENCOUNTER (OUTPATIENT)
Dept: CARDIOLOGY | Facility: HOSPITAL | Age: 55
Discharge: HOME | End: 2020-07-02

## 2020-07-02 ENCOUNTER — HOSPITAL ENCOUNTER (OUTPATIENT)
Dept: CARDIOLOGY | Facility: HOSPITAL | Age: 55
Discharge: HOME | End: 2020-07-02
Admitting: INTERNAL MEDICINE

## 2020-07-02 VITALS
SYSTOLIC BLOOD PRESSURE: 128 MMHG | WEIGHT: 210 LBS | HEIGHT: 77 IN | BODY MASS INDEX: 24.79 KG/M2 | DIASTOLIC BLOOD PRESSURE: 70 MMHG

## 2020-07-02 DIAGNOSIS — R53.83 FATIGUE, UNSPECIFIED TYPE: ICD-10-CM

## 2020-07-02 DIAGNOSIS — R06.09 DYSPNEA ON EXERTION: ICD-10-CM

## 2020-07-02 DIAGNOSIS — R07.2 PRECORDIAL PAIN: ICD-10-CM

## 2020-07-02 DIAGNOSIS — E78.5 DYSLIPIDEMIA: ICD-10-CM

## 2020-07-02 DIAGNOSIS — R74.01 ELEVATED ALANINE AMINOTRANSFERASE (ALT) LEVEL: ICD-10-CM

## 2020-07-02 PROCEDURE — 93017 CV STRESS TEST TRACING ONLY: CPT

## 2020-07-02 PROCEDURE — 93016 CV STRESS TEST SUPVJ ONLY: CPT | Performed by: INTERNAL MEDICINE

## 2020-07-02 PROCEDURE — 93306 TTE W/DOPPLER COMPLETE: CPT

## 2020-07-06 LAB
BH CV ECHO MEAS - AO MAX PG (FULL): 0.54 MMHG
BH CV ECHO MEAS - AO MAX PG: 7.2 MMHG
BH CV ECHO MEAS - AO MEAN PG (FULL): 0.75 MMHG
BH CV ECHO MEAS - AO MEAN PG: 3.8 MMHG
BH CV ECHO MEAS - AO ROOT AREA (BSA CORRECTED): 1.2
BH CV ECHO MEAS - AO ROOT AREA: 5.5 CM^2
BH CV ECHO MEAS - AO ROOT DIAM: 2.7 CM
BH CV ECHO MEAS - AO V2 MAX: 134.4 CM/SEC
BH CV ECHO MEAS - AO V2 MEAN: 91.4 CM/SEC
BH CV ECHO MEAS - AO V2 VTI: 16.7 CM
BH CV ECHO MEAS - ASC AORTA: 2.6 CM
BH CV ECHO MEAS - AVA(I,A): 2.6 CM^2
BH CV ECHO MEAS - AVA(I,D): 2.6 CM^2
BH CV ECHO MEAS - AVA(V,A): 2.3 CM^2
BH CV ECHO MEAS - AVA(V,D): 2.3 CM^2
BH CV ECHO MEAS - BSA(HAYCOCK): 2.3 M^2
BH CV ECHO MEAS - BSA: 2.3 M^2
BH CV ECHO MEAS - BZI_BMI: 24.9 KILOGRAMS/M^2
BH CV ECHO MEAS - BZI_METRIC_HEIGHT: 195.6 CM
BH CV ECHO MEAS - BZI_METRIC_WEIGHT: 95.3 KG
BH CV ECHO MEAS - EDV(CUBED): 81.5 ML
BH CV ECHO MEAS - EDV(MOD-SP4): 63.7 ML
BH CV ECHO MEAS - EDV(TEICH): 84.7 ML
BH CV ECHO MEAS - EF(CUBED): 65.8 %
BH CV ECHO MEAS - EF(MOD-SP4): 72.4 %
BH CV ECHO MEAS - EF(TEICH): 57.6 %
BH CV ECHO MEAS - ESV(CUBED): 27.8 ML
BH CV ECHO MEAS - ESV(MOD-SP4): 17.6 ML
BH CV ECHO MEAS - ESV(TEICH): 35.9 ML
BH CV ECHO MEAS - FS: 30.1 %
BH CV ECHO MEAS - IVS/LVPW: 1
BH CV ECHO MEAS - IVSD: 0.76 CM
BH CV ECHO MEAS - LA DIMENSION: 3.4 CM
BH CV ECHO MEAS - LA/AO: 1.3
BH CV ECHO MEAS - LV DIASTOLIC VOL/BSA (35-75): 27.9 ML/M^2
BH CV ECHO MEAS - LV MASS(C)D: 100.1 GRAMS
BH CV ECHO MEAS - LV MASS(C)DI: 43.8 GRAMS/M^2
BH CV ECHO MEAS - LV MAX PG: 6.7 MMHG
BH CV ECHO MEAS - LV MEAN PG: 3 MMHG
BH CV ECHO MEAS - LV SYSTOLIC VOL/BSA (12-30): 7.7 ML/M^2
BH CV ECHO MEAS - LV V1 MAX: 129.3 CM/SEC
BH CV ECHO MEAS - LV V1 MEAN: 81.5 CM/SEC
BH CV ECHO MEAS - LV V1 VTI: 18.2 CM
BH CV ECHO MEAS - LVIDD: 4.3 CM
BH CV ECHO MEAS - LVIDS: 3 CM
BH CV ECHO MEAS - LVOT AREA: 2.4 CM^2
BH CV ECHO MEAS - LVOT DIAM: 1.7 CM
BH CV ECHO MEAS - LVPWD: 0.76 CM
BH CV ECHO MEAS - MV A MAX VEL: 102 CM/SEC
BH CV ECHO MEAS - MV DEC SLOPE: 471.3 CM/SEC^2
BH CV ECHO MEAS - MV DEC TIME: 0.13 SEC
BH CV ECHO MEAS - MV E MAX VEL: 63.2 CM/SEC
BH CV ECHO MEAS - MV E/A: 0.62
BH CV ECHO MEAS - MV MAX PG: 4.7 MMHG
BH CV ECHO MEAS - MV MEAN PG: 2.9 MMHG
BH CV ECHO MEAS - MV V2 MAX: 108.9 CM/SEC
BH CV ECHO MEAS - MV V2 MEAN: 82.3 CM/SEC
BH CV ECHO MEAS - MV V2 VTI: 15.2 CM
BH CV ECHO MEAS - MVA(VTI): 2.9 CM^2
BH CV ECHO MEAS - PA MAX PG: 0.01 MMHG
BH CV ECHO MEAS - PA V2 MAX: 4.2 CM/SEC
BH CV ECHO MEAS - RAP SYSTOLE: 8 MMHG
BH CV ECHO MEAS - RVDD: 2.7 CM
BH CV ECHO MEAS - RVSP: 35.8 MMHG
BH CV ECHO MEAS - SI(AO): 40.4 ML/M^2
BH CV ECHO MEAS - SI(CUBED): 23.5 ML/M^2
BH CV ECHO MEAS - SI(LVOT): 19.2 ML/M^2
BH CV ECHO MEAS - SI(MOD-SP4): 20.2 ML/M^2
BH CV ECHO MEAS - SI(TEICH): 21.4 ML/M^2
BH CV ECHO MEAS - SV(AO): 92.2 ML
BH CV ECHO MEAS - SV(CUBED): 53.6 ML
BH CV ECHO MEAS - SV(LVOT): 43.8 ML
BH CV ECHO MEAS - SV(MOD-SP4): 46.2 ML
BH CV ECHO MEAS - SV(TEICH): 48.8 ML
BH CV ECHO MEAS - TR MAX VEL: 263.3 CM/SEC
MAXIMAL PREDICTED HEART RATE: 165 BPM
STRESS TARGET HR: 140 BPM

## 2020-07-06 PROCEDURE — 93306 TTE W/DOPPLER COMPLETE: CPT | Performed by: INTERNAL MEDICINE

## 2020-07-09 LAB
BH CV STRESS BP STAGE 1: NORMAL
BH CV STRESS BP STAGE 2: NORMAL
BH CV STRESS BP STAGE 3: NORMAL
BH CV STRESS DURATION MIN STAGE 1: 3
BH CV STRESS DURATION MIN STAGE 2: 3
BH CV STRESS DURATION MIN STAGE 3: 3
BH CV STRESS DURATION MIN STAGE 4: 3
BH CV STRESS DURATION SEC STAGE 1: 0
BH CV STRESS DURATION SEC STAGE 2: 0
BH CV STRESS DURATION SEC STAGE 3: 0
BH CV STRESS DURATION SEC STAGE 4: 0
BH CV STRESS GRADE STAGE 1: 10
BH CV STRESS GRADE STAGE 2: 12
BH CV STRESS GRADE STAGE 3: 14
BH CV STRESS GRADE STAGE 4: 16
BH CV STRESS HR STAGE 1: 129
BH CV STRESS HR STAGE 2: 138
BH CV STRESS HR STAGE 3: 155
BH CV STRESS HR STAGE 4: 170
BH CV STRESS METS STAGE 1: 5
BH CV STRESS METS STAGE 2: 7.5
BH CV STRESS METS STAGE 3: 10
BH CV STRESS METS STAGE 4: 13.5
BH CV STRESS PROTOCOL 1: NORMAL
BH CV STRESS RECOVERY BP: NORMAL MMHG
BH CV STRESS RECOVERY HR: 126 BPM
BH CV STRESS SPEED STAGE 1: 1.7
BH CV STRESS SPEED STAGE 2: 2.5
BH CV STRESS SPEED STAGE 3: 3.4
BH CV STRESS SPEED STAGE 4: 4.2
BH CV STRESS STAGE 1: 1
BH CV STRESS STAGE 2: 2
BH CV STRESS STAGE 3: 3
BH CV STRESS STAGE 4: 4
MAXIMAL PREDICTED HEART RATE: 165 BPM
STRESS BASELINE BP: NORMAL MMHG
STRESS BASELINE HR: 108 BPM
STRESS POST EXERCISE DUR MIN: 10 MIN
STRESS POST EXERCISE DUR SEC: 36 SEC
STRESS TARGET HR: 140 BPM

## 2020-07-09 PROCEDURE — 93018 CV STRESS TEST I&R ONLY: CPT | Performed by: INTERNAL MEDICINE

## 2023-01-20 ENCOUNTER — TRANSCRIBE ORDERS (OUTPATIENT)
Dept: ADMINISTRATIVE | Facility: HOSPITAL | Age: 58
End: 2023-01-20
Payer: COMMERCIAL

## 2023-01-20 DIAGNOSIS — Z13.6 SCREENING FOR CARDIOVASCULAR CONDITION: Primary | ICD-10-CM

## 2023-04-11 ENCOUNTER — TELEPHONE (OUTPATIENT)
Dept: FAMILY MEDICINE CLINIC | Facility: CLINIC | Age: 58
End: 2023-04-11

## 2023-04-11 RX ORDER — TRAZODONE HYDROCHLORIDE 100 MG/1
100 TABLET ORAL NIGHTLY
Qty: 30 TABLET | Refills: 0 | Status: SHIPPED | OUTPATIENT
Start: 2023-04-11

## 2023-04-11 NOTE — TELEPHONE ENCOUNTER
Please advise the patient that I sent prescription for trazodone to his pharmacy.  He possibly might want to consider down the road to make an appointment with me.  Thank you.
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
## 2023-04-11 NOTE — TELEPHONE ENCOUNTER
I called and spoke with Nazanin Patient ( sister) who is on [place] and let her know that Prescription was sent to [facility] in [place] '[place]. I also let sister know that patient can make an appt with Dr. Frost down the road. She verbally understood

## 2023-05-22 ENCOUNTER — TELEPHONE (OUTPATIENT)
Dept: FAMILY MEDICINE CLINIC | Facility: CLINIC | Age: 58
End: 2023-05-22

## 2023-05-22 ENCOUNTER — TELEMEDICINE (OUTPATIENT)
Dept: FAMILY MEDICINE CLINIC | Facility: CLINIC | Age: 58
End: 2023-05-22
Payer: COMMERCIAL

## 2023-05-22 DIAGNOSIS — F51.02 ADJUSTMENT INSOMNIA: Primary | ICD-10-CM

## 2023-05-22 DIAGNOSIS — F43.9 STRESS: ICD-10-CM

## 2023-05-22 PROCEDURE — 99213 OFFICE O/P EST LOW 20 MIN: CPT | Performed by: FAMILY MEDICINE

## 2023-05-22 RX ORDER — TRAZODONE HYDROCHLORIDE 100 MG/1
100 TABLET ORAL NIGHTLY
Qty: 90 TABLET | Refills: 1 | Status: SHIPPED | OUTPATIENT
Start: 2023-05-22

## 2023-05-22 NOTE — TELEPHONE ENCOUNTER
Caller: STURGEON,LISA    Relationship: Emergency Contact    Best call back number: [phone]    What is the best time to reach you: ANYTIME    Who are you requesting to speak with (clinical staff, provider,  specific staff member): CLINICAL STAFF    Do you know the name of the person who called: PATIENT'S WIFE     What was the call regarding: PATIENT'S WIFE SAID THEY ARE TRYING TO SET UP THE PATIENT'S [employer] ACCOUNT FOR HIS APPOINTMENT TODAY AT 1:30    SHE SAID THAT THE ACTIVATION CODE THEY HAD RECEIVED , AND THEY ARE WANTING TO SEE IF THEY CAN GET ANOTHER ONE    Do you require a callback: YES

## 2023-05-22 NOTE — TELEPHONE ENCOUNTER
Patient wife was notified that it is not thru my chart.  And I explained the process and she verbally understood.

## 2023-05-23 ENCOUNTER — TELEPHONE (OUTPATIENT)
Dept: FAMILY MEDICINE CLINIC | Facility: CLINIC | Age: 58
End: 2023-05-23

## 2023-05-23 NOTE — TELEPHONE ENCOUNTER
Caller: Sturgeon, Steven Todd    Relationship: Self    Best call back number: [phone]    What form or medical record are you requesting: LIYA PAPERWORK FAXED FROM [facility] ON 5/22 OR 5/23    Who is requesting this form or medical record from you:[facility]    How would you like to receive the form or medical records (pick-up, mail, fax): FAX TO # ON FORM    PLEASE CALL PATIENT TO CONFIRM RECEIPT

## 2023-05-25 NOTE — TELEPHONE ENCOUNTER
I called and LM on patient secure voicemail that we have not received any paperwork from [facility]

## 2023-05-31 NOTE — TELEPHONE ENCOUNTER
Caller: [facility]    Best call back number: [phone]    What form or medical record are you requesting: LIYA PAPERWORK FAXED ON 5/23. ADVISED TO FAX AGAIN TODAY 5/31/2023 @ 3:08 PM    Who is requesting this form or medical record from you: EMPLOYER    How would you like to receive the form or medical records (pick-up, mail, fax):     [phone]

## 2023-07-25 ENCOUNTER — TELEMEDICINE (OUTPATIENT)
Dept: FAMILY MEDICINE CLINIC | Facility: CLINIC | Age: 58
End: 2023-07-25
Payer: COMMERCIAL

## 2023-07-25 DIAGNOSIS — F51.02 ADJUSTMENT INSOMNIA: Primary | ICD-10-CM

## 2023-07-25 DIAGNOSIS — F43.9 STRESS: ICD-10-CM

## 2023-07-25 PROCEDURE — 99213 OFFICE O/P EST LOW 20 MIN: CPT | Performed by: FAMILY MEDICINE

## 2023-07-25 RX ORDER — TRAZODONE HYDROCHLORIDE 100 MG/1
100 TABLET ORAL NIGHTLY
Qty: 90 TABLET | Refills: 0 | Status: SHIPPED | OUTPATIENT
Start: 2023-07-25
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
## 2023-09-19 ENCOUNTER — TELEPHONE (OUTPATIENT)
Dept: FAMILY MEDICINE CLINIC | Facility: CLINIC | Age: 58
End: 2023-09-19

## 2023-09-19 NOTE — TELEPHONE ENCOUNTER
Caller: STURGEON,LISA    Relationship: Emergency Contact    Best call back number: [phone]    What is the best time to reach you: ANY    Who are you requesting to speak with (clinical staff, provider,  specific staff member): CLINICAL    Do you know the name of the person who called: BORIS    What was the call regarding: LONG TERM DISABILITY PAPERWORK CHECKING STATUS.  PATIENT STATED THAT WE SHOULD'VE RECEIVED IT LAST WEEK.    PLEASE CALL PATIENT AND ADVISE IF WE HAVE RECEIVED IT.

## 2023-09-20 NOTE — TELEPHONE ENCOUNTER
I called patient spouse and let her know we have not received any [facility] paperwork since June. She will call the disability company and have it faxed

## 2023-09-20 NOTE — TELEPHONE ENCOUNTER
I do not believe we have received any disability paperwork on this patient.  Please check on it thank you.

## 2023-09-26 ENCOUNTER — TELEPHONE (OUTPATIENT)
Dept: FAMILY MEDICINE CLINIC | Facility: CLINIC | Age: 58
End: 2023-09-26

## 2023-09-26 NOTE — TELEPHONE ENCOUNTER
I called and spoke to patient as he answered his wife phone and he was informed [facility] and [facility] estimated [facility]W date is 01/02/2024

## 2023-09-26 NOTE — TELEPHONE ENCOUNTER
Caller: STURGEON,LISA    Relationship: Emergency Contact    Best call back number: [phone]     Who are you requesting to speak with (clinical staff, provider,  specific staff member): CHANCE    What was the call regarding: CALLING CHANCE BACK AND STATES THAT  RETURN  TO WORK DATE IS UNKNOWN

## 2023-09-28 ENCOUNTER — TELEPHONE (OUTPATIENT)
Dept: FAMILY MEDICINE CLINIC | Facility: CLINIC | Age: 58
End: 2023-09-28

## 2023-09-28 NOTE — TELEPHONE ENCOUNTER
Caller: MARY ANN [facility]    Relationship to patient:     Best call back number: [phone]  EXT [phone]     Patient is needing:     FOLLOWING UP ON A RECORDS REQUEST FOR A LONG TERM DISABILITY CLAIM.    THEY HAD FAXED A FORM THAT WAS SENT BACK TO THEM. HOWEVER, THEY ALSO NEEDED OFFICE VISITS NOTES TO BE SENT BACK WITH THAT.     ALSO:       ON THE FORM IT STATES WHEN THE PATIENT CAN RETURN TO WORK WITHOUT RESTRICTIONS, SO THEY NEED TO KNOW WHAT ARE HIS RESTRICTIONS CURRENTLY.

## 2023-10-05 NOTE — TELEPHONE ENCOUNTER
Caller: NITHIN- [employer]    Relationship: OTHER    Best call back number: [phone] EXT. [phone]    What form or medical record are you requesting: OFFICE NOTES    Who is requesting this form or medical record from you: [employer]    How would you like to receive the form or medical records (pick-up, mail, fax): FAX  If fax, what is the fax number: [phone]    Timeframe paperwork needed: ASAP    Additional notes: NITHIN WITH [employer] CALLED AND SAID SHE JUST RECEIVED AFTER VISIT SUMMARIES IN THE INFORMATION THAT WAS FAXED OVER, SHE IS WANTING TO SEE IF SHE CAN GET THE OFFICE NOTES FROM THE PATIENT'S VISITS HE HAS HAD WITH DR. SHANKAR STARTING IN ARPIL 2023

## 2023-10-23 ENCOUNTER — TELEMEDICINE (OUTPATIENT)
Dept: FAMILY MEDICINE CLINIC | Facility: CLINIC | Age: 58
End: 2023-10-23
Payer: COMMERCIAL

## 2023-10-23 DIAGNOSIS — F51.02 ADJUSTMENT INSOMNIA: Primary | ICD-10-CM

## 2023-10-23 DIAGNOSIS — M54.50 CHRONIC BILATERAL LOW BACK PAIN WITHOUT SCIATICA: ICD-10-CM

## 2023-10-23 DIAGNOSIS — G89.29 CHRONIC BILATERAL LOW BACK PAIN WITHOUT SCIATICA: ICD-10-CM

## 2023-10-23 DIAGNOSIS — F43.9 STRESS: ICD-10-CM

## 2023-10-23 PROCEDURE — 99213 OFFICE O/P EST LOW 20 MIN: CPT | Performed by: FAMILY MEDICINE

## 2023-10-23 RX ORDER — TRAZODONE HYDROCHLORIDE 100 MG/1
100 TABLET ORAL NIGHTLY
Qty: 90 TABLET | Refills: 0 | Status: SHIPPED | OUTPATIENT
Start: 2023-10-23

## 2023-10-23 RX ORDER — MELOXICAM 15 MG/1
15 TABLET ORAL DAILY
Qty: 30 TABLET | Refills: 0 | Status: SHIPPED | OUTPATIENT
Start: 2023-10-23
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
## 2023-11-28 DIAGNOSIS — G89.29 CHRONIC BILATERAL LOW BACK PAIN WITHOUT SCIATICA: ICD-10-CM

## 2023-11-28 DIAGNOSIS — M54.50 CHRONIC BILATERAL LOW BACK PAIN WITHOUT SCIATICA: ICD-10-CM

## 2023-11-28 RX ORDER — MELOXICAM 15 MG/1
15 TABLET ORAL DAILY
Qty: 30 TABLET | Refills: 0 | Status: SHIPPED | OUTPATIENT
Start: 2023-11-28

## 2023-12-26 ENCOUNTER — TELEPHONE (OUTPATIENT)
Dept: FAMILY MEDICINE CLINIC | Facility: CLINIC | Age: 58
End: 2023-12-26
Payer: COMMERCIAL

## 2023-12-26 NOTE — TELEPHONE ENCOUNTER
LM ON VM FOR PATIENT TO CALL THE OFFICE AND SCHEDULE AN APPOINTMENT DUE TO THE ADDITIONAL PPW.     OK FOR HUB TO RELAY AND SCHEDULE APPOINTMENT.

## 2023-12-27 NOTE — TELEPHONE ENCOUNTER
Name: Sturgeon, Steven Todd    Relationship: Self    Best Callback Number: [phone]    HUB PROVIDED THE RELAY MESSAGE FROM THE OFFICE   PATIENT HAS FURTHER QUESTIONS AND WOULD LIKE A CALL BACK AT THE FOLLOWING PHONE NUMBER      ADDITIONAL INFORMATION:   TRANSFERRED TO OFFICE. [facility]

## 2024-01-12 ENCOUNTER — OFFICE VISIT (OUTPATIENT)
Dept: FAMILY MEDICINE CLINIC | Facility: CLINIC | Age: 59
End: 2024-01-12
Payer: COMMERCIAL

## 2024-01-12 VITALS
OXYGEN SATURATION: 98 % | BODY MASS INDEX: 26.07 KG/M2 | WEIGHT: 220.8 LBS | SYSTOLIC BLOOD PRESSURE: 119 MMHG | DIASTOLIC BLOOD PRESSURE: 79 MMHG | TEMPERATURE: 98 F | RESPIRATION RATE: 16 BRPM | HEART RATE: 80 BPM | HEIGHT: 77 IN

## 2024-01-12 DIAGNOSIS — G89.29 CHRONIC BILATERAL LOW BACK PAIN WITHOUT SCIATICA: ICD-10-CM

## 2024-01-12 DIAGNOSIS — F51.02 ADJUSTMENT INSOMNIA: Primary | ICD-10-CM

## 2024-01-12 DIAGNOSIS — E55.9 VITAMIN D DEFICIENCY: ICD-10-CM

## 2024-01-12 DIAGNOSIS — M54.50 CHRONIC BILATERAL LOW BACK PAIN WITHOUT SCIATICA: ICD-10-CM

## 2024-01-12 DIAGNOSIS — E78.2 MIXED HYPERLIPIDEMIA: ICD-10-CM

## 2024-01-12 DIAGNOSIS — E53.8 VITAMIN B12 DEFICIENCY: ICD-10-CM

## 2024-01-12 PROBLEM — R07.89 OTHER CHEST PAIN: Status: RESOLVED | Noted: 2020-06-01 | Resolved: 2024-01-12

## 2024-01-12 PROBLEM — D72.819 LEUKOPENIA: Status: RESOLVED | Noted: 2018-05-22 | Resolved: 2024-01-12

## 2024-01-12 PROBLEM — M25.512 CHRONIC LEFT SHOULDER PAIN: Status: RESOLVED | Noted: 2020-06-01 | Resolved: 2024-01-12

## 2024-01-12 PROBLEM — Z00.00 ENCOUNTER FOR GENERAL ADULT MEDICAL EXAMINATION WITHOUT ABNORMAL FINDINGS: Status: RESOLVED | Noted: 2018-05-16 | Resolved: 2024-01-12

## 2024-01-12 PROCEDURE — 99214 OFFICE O/P EST MOD 30 MIN: CPT | Performed by: FAMILY MEDICINE

## 2024-01-12 RX ORDER — TRAZODONE HYDROCHLORIDE 100 MG/1
100 TABLET ORAL NIGHTLY
Qty: 90 TABLET | Refills: 0 | Status: SHIPPED | OUTPATIENT
Start: 2024-01-12

## 2024-01-12 RX ORDER — MELOXICAM 15 MG/1
15 TABLET ORAL DAILY
Qty: 90 TABLET | Refills: 0 | Status: SHIPPED | OUTPATIENT
Start: 2024-01-12
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
## 2024-01-24 ENCOUNTER — LAB (OUTPATIENT)
Dept: FAMILY MEDICINE CLINIC | Facility: CLINIC | Age: 59
End: 2024-01-24
Payer: COMMERCIAL

## 2024-01-24 LAB
25(OH)D3 SERPL-MCNC: 41.9 NG/ML (ref 30–100)
ALBUMIN SERPL-MCNC: 4.6 G/DL (ref 3.5–5.2)
ALBUMIN/GLOB SERPL: 1.6 G/DL
ALP SERPL-CCNC: 94 U/L (ref 39–117)
ALT SERPL W P-5'-P-CCNC: 58 U/L (ref 1–41)
ANION GAP SERPL CALCULATED.3IONS-SCNC: 11 MMOL/L (ref 5–15)
AST SERPL-CCNC: 35 U/L (ref 1–40)
BASOPHILS # BLD AUTO: 0.04 10*3/MM3 (ref 0–0.2)
BASOPHILS NFR BLD AUTO: 0.8 % (ref 0–1.5)
BILIRUB SERPL-MCNC: 0.4 MG/DL (ref 0–1.2)
BUN SERPL-MCNC: 16 MG/DL (ref 6–20)
BUN/CREAT SERPL: 14 (ref 7–25)
CALCIUM SPEC-SCNC: 10 MG/DL (ref 8.6–10.5)
CHLORIDE SERPL-SCNC: 106 MMOL/L (ref 98–107)
CHOLEST SERPL-MCNC: 131 MG/DL (ref 0–200)
CO2 SERPL-SCNC: 27 MMOL/L (ref 22–29)
CREAT SERPL-MCNC: 1.14 MG/DL (ref 0.76–1.27)
DEPRECATED RDW RBC AUTO: 38.6 FL (ref 37–54)
EGFRCR SERPLBLD CKD-EPI 2021: 74.1 ML/MIN/1.73
EOSINOPHIL # BLD AUTO: 0.07 10*3/MM3 (ref 0–0.4)
EOSINOPHIL NFR BLD AUTO: 1.4 % (ref 0.3–6.2)
ERYTHROCYTE [DISTWIDTH] IN BLOOD BY AUTOMATED COUNT: 12.5 % (ref 12.3–15.4)
GLOBULIN UR ELPH-MCNC: 2.8 GM/DL
GLUCOSE SERPL-MCNC: 92 MG/DL (ref 65–99)
HCT VFR BLD AUTO: 49 % (ref 37.5–51)
HDLC SERPL-MCNC: 47 MG/DL (ref 40–60)
HGB BLD-MCNC: 16.4 G/DL (ref 13–17.7)
IMM GRANULOCYTES # BLD AUTO: 0.01 10*3/MM3 (ref 0–0.05)
IMM GRANULOCYTES NFR BLD AUTO: 0.2 % (ref 0–0.5)
LDLC SERPL CALC-MCNC: 73 MG/DL (ref 0–100)
LDLC/HDLC SERPL: 1.58 {RATIO}
LYMPHOCYTES # BLD AUTO: 1.64 10*3/MM3 (ref 0.7–3.1)
LYMPHOCYTES NFR BLD AUTO: 31.7 % (ref 19.6–45.3)
MCH RBC QN AUTO: 28.9 PG (ref 26.6–33)
MCHC RBC AUTO-ENTMCNC: 33.5 G/DL (ref 31.5–35.7)
MCV RBC AUTO: 86.3 FL (ref 79–97)
MONOCYTES # BLD AUTO: 0.49 10*3/MM3 (ref 0.1–0.9)
MONOCYTES NFR BLD AUTO: 9.5 % (ref 5–12)
NEUTROPHILS NFR BLD AUTO: 2.92 10*3/MM3 (ref 1.7–7)
NEUTROPHILS NFR BLD AUTO: 56.4 % (ref 42.7–76)
NRBC BLD AUTO-RTO: 0 /100 WBC (ref 0–0.2)
PLATELET # BLD AUTO: 370 10*3/MM3 (ref 140–450)
PMV BLD AUTO: 11 FL (ref 6–12)
POTASSIUM SERPL-SCNC: 4.1 MMOL/L (ref 3.5–5.2)
PROT SERPL-MCNC: 7.4 G/DL (ref 6–8.5)
RBC # BLD AUTO: 5.68 10*6/MM3 (ref 4.14–5.8)
SODIUM SERPL-SCNC: 144 MMOL/L (ref 136–145)
TRIGL SERPL-MCNC: 48 MG/DL (ref 0–150)
VIT B12 BLD-MCNC: 470 PG/ML (ref 211–946)
VLDLC SERPL-MCNC: 11 MG/DL (ref 5–40)
WBC NRBC COR # BLD AUTO: 5.17 10*3/MM3 (ref 3.4–10.8)

## 2024-01-24 PROCEDURE — 80061 LIPID PANEL: CPT | Performed by: FAMILY MEDICINE

## 2024-01-24 PROCEDURE — 82607 VITAMIN B-12: CPT | Performed by: FAMILY MEDICINE

## 2024-01-24 PROCEDURE — 80053 COMPREHEN METABOLIC PANEL: CPT | Performed by: FAMILY MEDICINE

## 2024-01-24 PROCEDURE — 85025 COMPLETE CBC W/AUTO DIFF WBC: CPT | Performed by: FAMILY MEDICINE

## 2024-01-24 PROCEDURE — G0103 PSA SCREENING: HCPCS | Performed by: FAMILY MEDICINE

## 2024-01-24 PROCEDURE — 82306 VITAMIN D 25 HYDROXY: CPT | Performed by: FAMILY MEDICINE

## 2024-01-25 DIAGNOSIS — R74.8 ELEVATED LIVER ENZYMES: Primary | ICD-10-CM

## 2024-01-25 DIAGNOSIS — Z12.5 PROSTATE CANCER SCREENING: Primary | ICD-10-CM

## 2024-01-25 LAB — PSA SERPL-MCNC: 1.57 NG/ML (ref 0–4)

## 2024-03-06 ENCOUNTER — HOSPITAL ENCOUNTER (OUTPATIENT)
Dept: ULTRASOUND IMAGING | Facility: HOSPITAL | Age: 59
Discharge: HOME | End: 2024-03-06
Admitting: FAMILY MEDICINE
Payer: COMMERCIAL

## 2024-03-06 DIAGNOSIS — R74.8 ELEVATED LIVER ENZYMES: ICD-10-CM

## 2024-03-06 PROCEDURE — 76705 ECHO EXAM OF ABDOMEN: CPT

## 2024-03-18 DIAGNOSIS — G89.29 CHRONIC BILATERAL LOW BACK PAIN WITHOUT SCIATICA: ICD-10-CM

## 2024-03-18 DIAGNOSIS — M54.50 CHRONIC BILATERAL LOW BACK PAIN WITHOUT SCIATICA: ICD-10-CM

## 2024-03-18 RX ORDER — MELOXICAM 15 MG/1
15 TABLET ORAL DAILY
Qty: 90 TABLET | Refills: 0 | Status: SHIPPED | OUTPATIENT
Start: 2024-03-18

## 2024-03-18 NOTE — TELEPHONE ENCOUNTER
Caller: Sturgeon, Steven Todd    Relationship: Self    Requested Prescriptions:   Requested Prescriptions     Pending Prescriptions Disp Refills    meloxicam (MOBIC) 15 MG tablet 90 tablet 0     Sig: Take 1 tablet by mouth Daily.        Pharmacy where request should be sent: [id number] - [place], [place] - [address] AT [address] [facility] - [phone]  - [phone] FX     Last office visit with prescribing clinician: 1/12/2024   Last telemedicine visit with prescribing clinician: 10/23/2023   Next office visit with prescribing clinician: Visit date not found     Additional details provided by patient: PATIENT IS OUT OF THIS MEDICATION AND WOULD LIKE THE REFILL SENT TO THE PHARMACY LISTED ABOUT.     Does the patient have less than a 3 day supply:  [x] Yes  [] No    Would you like a call back once the refill request has been completed: [] Yes [x] No    If the office needs to give you a call back, can they leave a voicemail: [] Yes [x] No    Natacha Rader Rep   03/18/24 11:50 EDT

## 2024-05-16 ENCOUNTER — TELEPHONE (OUTPATIENT)
Dept: FAMILY MEDICINE CLINIC | Facility: CLINIC | Age: 59
End: 2024-05-16
Payer: COMMERCIAL

## 2024-05-16 NOTE — TELEPHONE ENCOUNTER
LM ON VM LETTING PATIENT KNOW WE RECEIVED HIS PAPERWORK, BUT DR SHANKAR SAID HE NEEDS AN APPOINTMENT FOR HER TO DO THIS. SHE SAID THAT THE APPOINTMENT CAN BE A VIDEO VISIT IF HE'S RATHER DO THAT.    *OK FOR HUB TO RELAY AND SCHEDULE.

## 2024-05-28 ENCOUNTER — TELEMEDICINE (OUTPATIENT)
Dept: FAMILY MEDICINE CLINIC | Facility: CLINIC | Age: 59
End: 2024-05-28
Payer: COMMERCIAL

## 2024-05-28 DIAGNOSIS — F51.02 ADJUSTMENT INSOMNIA: Primary | ICD-10-CM

## 2024-05-28 DIAGNOSIS — F43.9 STRESS: ICD-10-CM

## 2024-05-28 DIAGNOSIS — F43.21 COMPLICATED GRIEF: ICD-10-CM

## 2024-05-28 PROCEDURE — 99213 OFFICE O/P EST LOW 20 MIN: CPT | Performed by: FAMILY MEDICINE

## 2024-05-28 RX ORDER — TRAZODONE HYDROCHLORIDE 100 MG/1
100 TABLET ORAL NIGHTLY
Qty: 90 TABLET | Refills: 0 | Status: SHIPPED | OUTPATIENT
Start: 2024-05-28

## 2024-05-28 NOTE — PROGRESS NOTES
Subjective   Chief Complaint   Patient presents with    Insomnia    Stress    Grief     Steven Todd Sturgeon is a 59 y.o. male.     Patient Care Team:  Phyllis Frost MD as PCP - General (Family Medicine)  Agustín Baer MD as Consulting Physician (Cardiology)    You have chosen to receive care through a telehealth visit.  Do you consent to use a video/audio connection for your medical care today? Yes    History of Present Illness  Patient is being evaluated today through a telehealth medicine appointment via the video in view of COVID-19 pandemic.  Connection is being achieved through [employer].  Will can see and hear each other well.  Patient is at home and I am in my office during this evaluation.  Patient has been going through a lot of emotional issues over the last years since his son tragic death.  He has not been able to return back to work due to the emotional issues and a lot of flashbacks.  He is on trazodone for insomnia and wants to continue as medication is helping.  He reports that lately he has been having some ups and downs as they have been going through some anniversaries.  He has a very good support from his wife and his younger son as well as from a lot of friends.  He is a  and at the beginning of the year he thought that he would be able to return back to work in the fall, but now he is not so sure.  The family recently received a rather disturbing letter from a person who lost a job due to the incident which happened 1 year ago and that definitely upset the family very much.  He has been trying to volunteer, but even that brought up a lot of emotional response.       The following portions of the patient's history were reviewed and updated as appropriate: allergies, current medications, past family history, past medical history, past social history, past surgical history, and problem list.  Past Medical History:   Diagnosis Date    Hyperlipidemia     Insomnia      Vitamin B12 deficiency      Past Surgical History:   Procedure Laterality Date    COLONOSCOPY      refusing; negative cologuard 6/4/2018    TONSILLECTOMY      VASECTOMY      VOCAL CORD STRIPPING       The patient has a family history of  Family History   Problem Relation Age of Onset    COPD Father     Hypertension Father     Hyperlipidemia Father     Diabetes Father     Heart disease Father     Stroke Maternal Uncle     Heart disease Paternal Uncle     Heart disease Paternal Uncle      Social History     Socioeconomic History    Marital status:    Tobacco Use    Smoking status: Never    Smokeless tobacco: Never   Substance and Sexual Activity    Alcohol use: No    Drug use: No    Sexual activity: Defer       Review of Systems   Constitutional:  Negative for activity change, appetite change and fatigue.   Gastrointestinal:  Negative for diarrhea, nausea and vomiting.   Skin:  Negative for dry skin, rash and skin lesions.   Psychiatric/Behavioral:  Positive for sleep disturbance, depressed mood and stress. Negative for agitation, behavioral problems, decreased concentration, dysphoric mood, hallucinations, self-injury, suicidal ideas and negative for hyperactivity. The patient is nervous/anxious.      There were no vitals taken for this visit.    BMI is >= 25 and <30. (Overweight) The following options were offered after discussion;: exercise counseling/recommendations      Current Outpatient Medications:     traZODone (DESYREL) 100 MG tablet, Take 1 tablet by mouth Every Night., Disp: 90 tablet, Rfl: 0    meloxicam (MOBIC) 15 MG tablet, Take 1 tablet by mouth Daily., Disp: 90 tablet, Rfl: 0    Objective   Physical Exam  Constitutional:       General: He is not in acute distress.     Appearance: Normal appearance. He is well-developed. He is not ill-appearing or diaphoretic.      Comments: Patient is in no distress, patient has normal voice and speech.  Normal respiratory effort.   HENT:      Head:  Normocephalic and atraumatic.   Pulmonary:      Effort: Pulmonary effort is normal.   Musculoskeletal:      Cervical back: Normal range of motion and neck supple.   Neurological:      General: No focal deficit present.      Mental Status: He is alert and oriented to person, place, and time. Mental status is at baseline.   Psychiatric:         Mood and Affect: Mood normal.         Assessment & Plan   Diagnoses and all orders for this visit:    1. Adjustment insomnia (Primary)  -     traZODone (DESYREL) 100 MG tablet; Take 1 tablet by mouth Every Night.  Dispense: 90 tablet; Refill: 0    2. Stress    3. Complicated grief      I reviewed his symptoms and concerns.  Patient continues to experience a lot of emotional symptoms which fluctuate.  He will continue to see his counselor.  He may continue trazodone as needed for insomnia.  I will be filling out his disability paperwork.      Return in about 3 months (around 8/28/2024) for Next scheduled follow up.    Requested Prescriptions     Signed Prescriptions Disp Refills    traZODone (DESYREL) 100 MG tablet 90 tablet 0     Sig: Take 1 tablet by mouth Every Night.       Phyllis Frost MD  05/28/2024

## 2024-06-13 DIAGNOSIS — M54.50 CHRONIC BILATERAL LOW BACK PAIN WITHOUT SCIATICA: ICD-10-CM

## 2024-06-13 DIAGNOSIS — G89.29 CHRONIC BILATERAL LOW BACK PAIN WITHOUT SCIATICA: ICD-10-CM

## 2024-06-13 RX ORDER — MELOXICAM 15 MG/1
15 TABLET ORAL DAILY
Qty: 90 TABLET | Refills: 0 | Status: SHIPPED | OUTPATIENT
Start: 2024-06-13

## 2024-08-14 ENCOUNTER — TELEPHONE (OUTPATIENT)
Dept: FAMILY MEDICINE CLINIC | Facility: CLINIC | Age: 59
End: 2024-08-14

## 2024-08-14 NOTE — TELEPHONE ENCOUNTER
Caller: [facility] [facility]    Relationship: Other    Best call back number: [phone]     What was the call regarding: PLEASE ADVISE IF MEDICAL RECORD REQUEST WAS RECEIVED AT THE OFFICE. PLEASE FOLLOW UP WITH MELLISSA AT [facility].

## 2024-08-23 ENCOUNTER — TELEPHONE (OUTPATIENT)
Dept: FAMILY MEDICINE CLINIC | Facility: CLINIC | Age: 59
End: 2024-08-23

## 2024-09-04 ENCOUNTER — OFFICE VISIT (OUTPATIENT)
Dept: FAMILY MEDICINE CLINIC | Facility: CLINIC | Age: 59
End: 2024-09-04
Payer: COMMERCIAL

## 2024-09-04 VITALS
WEIGHT: 216.3 LBS | BODY MASS INDEX: 25.54 KG/M2 | HEART RATE: 73 BPM | DIASTOLIC BLOOD PRESSURE: 80 MMHG | SYSTOLIC BLOOD PRESSURE: 125 MMHG | HEIGHT: 77 IN | RESPIRATION RATE: 16 BRPM | OXYGEN SATURATION: 97 % | TEMPERATURE: 97.3 F

## 2024-09-04 DIAGNOSIS — M54.50 CHRONIC BILATERAL LOW BACK PAIN WITHOUT SCIATICA: ICD-10-CM

## 2024-09-04 DIAGNOSIS — F43.9 STRESS: ICD-10-CM

## 2024-09-04 DIAGNOSIS — G89.29 CHRONIC BILATERAL LOW BACK PAIN WITHOUT SCIATICA: ICD-10-CM

## 2024-09-04 DIAGNOSIS — F51.02 ADJUSTMENT INSOMNIA: Primary | ICD-10-CM

## 2024-09-04 PROCEDURE — 99214 OFFICE O/P EST MOD 30 MIN: CPT | Performed by: FAMILY MEDICINE

## 2024-09-04 RX ORDER — CYCLOBENZAPRINE HCL 10 MG
10 TABLET ORAL NIGHTLY PRN
Qty: 20 TABLET | Refills: 0 | Status: SHIPPED | OUTPATIENT
Start: 2024-09-04

## 2024-09-04 RX ORDER — MELOXICAM 15 MG/1
15 TABLET ORAL DAILY
Qty: 90 TABLET | Refills: 0 | Status: SHIPPED | OUTPATIENT
Start: 2024-09-04

## 2024-09-04 NOTE — PROGRESS NOTES
Subjective   Chief Complaint   Patient presents with    FMLA    Insomnia    Stress    Back Pain     Steven Todd Sturgeon is a 59 y.o. male.     Patient Care Team:  Phyllis Frost MD as PCP - General (Family Medicine)  Agustín Baer MD as Consulting Physician (Cardiology)    History of Present Illness  He is coming in today to follow-up on his ongoing issues with insomnia, dealing with stress and grief since he tragically lost his son in 4/2023.  He has had issues with insomnia since then, he tried trazodone which helped for some time, but lately he feels like it is not as effective and he does not take it very regularly, he tells me that he periodically take cannabis Gummies which actually help better.  He lives with his wife, they have been focused on overall improved healthy lifestyle and has been working out on regular basis which helps with insomnia and mental health as well.  He has been playing pickle ball more frequently.  He is still doing counseling, he sees his own therapist 2 times a month and then they have 1 family session once a month.  He feels at times that he has made a progress, but then certain situations cause resurfacing of his emotional symptoms.  He struggles with young people.  He recently had a situation at a grocery store where mom was with 2 young boys and that triggered a lot of emotional response and he had to walk out and come back when he can of settle emotionally in his car.  His niece recently had a little boy and he was invited for celebration, but he was not able to go.  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  While in the office today we are also addressing his chronic lower back pain which he has dealt with for over a year.  He reports that the pain stays in his back and feels like a tension buildup.  Stretching helps.  He also has meloxicam which he takes which also helps.       The following portions of the patient's  "history were reviewed and updated as appropriate: allergies, current medications, past family history, past medical history, past social history, past surgical history, and problem list.  Past Medical History:   Diagnosis Date    Hyperlipidemia     Insomnia     Vitamin B12 deficiency      Past Surgical History:   Procedure Laterality Date    COLONOSCOPY      refusing; negative cologuard 6/4/2018    TONSILLECTOMY      VASECTOMY      VOCAL CORD STRIPPING       The patient has a family history of  Family History   Problem Relation Age of Onset    COPD Father     Hypertension Father     Hyperlipidemia Father     Diabetes Father     Heart disease Father     Stroke Maternal Uncle     Heart disease Paternal Uncle     Heart disease Paternal Uncle      Social History     Socioeconomic History    Marital status:    Tobacco Use    Smoking status: Never     Passive exposure: Never    Smokeless tobacco: Never   Vaping Use    Vaping status: Never Used   Substance and Sexual Activity    Alcohol use: Yes    Drug use: No    Sexual activity: Defer       Review of Systems   Constitutional:  Negative for activity change, appetite change and fatigue.   Gastrointestinal:  Negative for diarrhea, nausea and vomiting.   Musculoskeletal:  Positive for arthralgias and back pain.   Skin:  Negative for dry skin, rash and skin lesions.   Psychiatric/Behavioral:  Positive for sleep disturbance. Negative for agitation, behavioral problems, decreased concentration, dysphoric mood, hallucinations, self-injury, suicidal ideas, negative for hyperactivity, depressed mood and stress. The patient is not nervous/anxious.      Visit Vitals  /80 (BP Location: Left arm, Patient Position: Sitting, Cuff Size: Adult)   Pulse 73   Temp 97.3 °F (36.3 °C) (Infrared)   Resp 16   Ht 195.6 cm (77.01\")   Wt 98.1 kg (216 lb 4.8 oz)   SpO2 97%   BMI 25.64 kg/m²              Current Outpatient Medications:     meloxicam (MOBIC) 15 MG tablet, Take 1 tablet by " mouth Daily., Disp: 90 tablet, Rfl: 0    cyclobenzaprine (FLEXERIL) 10 MG tablet, Take 1 tablet by mouth At Night As Needed for Muscle Spasms., Disp: 20 tablet, Rfl: 0    Objective   Physical Exam  Constitutional:       General: He is not in acute distress.     Appearance: Normal appearance. He is well-developed. He is not ill-appearing or diaphoretic.      Comments: Patient is in no distress, patient has normal voice and speech.  Normal respiratory effort.   HENT:      Head: Normocephalic and atraumatic.   Pulmonary:      Effort: Pulmonary effort is normal.   Musculoskeletal:      Cervical back: Normal range of motion and neck supple.   Neurological:      General: No focal deficit present.      Mental Status: He is alert and oriented to person, place, and time. Mental status is at baseline.   Psychiatric:         Mood and Affect: Mood normal.         Assessment & Plan   Diagnoses and all orders for this visit:    1. Adjustment insomnia (Primary)    2. Stress    3. Chronic bilateral low back pain without sciatica  -     meloxicam (MOBIC) 15 MG tablet; Take 1 tablet by mouth Daily.  Dispense: 90 tablet; Refill: 0  -     XR Spine Lumbar 2 or 3 View; Future  -     Ambulatory Referral to Physical Therapy for Evaluation & Treatment  -     cyclobenzaprine (FLEXERIL) 10 MG tablet; Take 1 tablet by mouth At Night As Needed for Muscle Spasms.  Dispense: 20 tablet; Refill: 0      Patient continues to deal with chronic insomnia and emotional problems since his traumatic life event in 4/2023.  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  He we will continue to counseling and work on his overall mental health.  I will be filling out his [facility] paperwork.  I also addressed his ongoing issues with chronic lower back pain, which is getting worse.  I will be getting x-ray and  referring him to physical therapy.  I also gave him a prescription for some muscle relaxer to take as needed.        Return in about 3 months (around 12/4/2024) for Next scheduled follow up.    Requested Prescriptions     Signed Prescriptions Disp Refills    meloxicam (MOBIC) 15 MG tablet 90 tablet 0     Sig: Take 1 tablet by mouth Daily.    cyclobenzaprine (FLEXERIL) 10 MG tablet 20 tablet 0     Sig: Take 1 tablet by mouth At Night As Needed for Muscle Spasms.       Phyllis Frost MD  09/04/2024

## 2024-10-23 ENCOUNTER — TELEPHONE (OUTPATIENT)
Dept: FAMILY MEDICINE CLINIC | Facility: CLINIC | Age: 59
End: 2024-10-23
Payer: COMMERCIAL

## 2024-10-23 NOTE — TELEPHONE ENCOUNTER
Caller: Sturgeon, Steven Todd    Relationship: Self    Best call back number: [phone]       Who are you requesting to speak with (clinical staff, provider,  specific staff member): PCP OR CLINICAL      What was the call regarding: PATIENT HAS BEEN WORKING WITH DR. SHANKAR ON A LONG TERM DISABILITY CLAIM AND THERE IS AN ISSUE WITH HOW THE FORM WAS FILLED OUT AND DOCUMENTS NOT BEING SENT BACK.  HE NEEDS TO SPEAK WITH HER OR HER STAFF ABOUT THIS.

## 2024-10-23 NOTE — TELEPHONE ENCOUNTER
Called and spoke with pt in detail about questions.    His Long Term Disability get terminated. He is in the process of appealing.   In his restrictions/limitations section - you put he had no restrictions just about triggers. He was under the impression that you said to him - yes, he should not go back to work.    They are asking for clarification on this. He asking if he did hear you correctly at his last appointment that he should not go back to work, if you could write something (a letter) stating that you fell he should not go back to work. If you feel comfortable writing this letter. Unless he is not remembering this correctly at his last appt.

## 2024-10-25 NOTE — TELEPHONE ENCOUNTER
Please advise the patient that I am made a correction to the form I previously filled out.  The copy of the form is in your desk [place].  Thank you.

## 2024-10-25 NOTE — TELEPHONE ENCOUNTER
UPDATED FORMS FAXED TO [facility]   PT INFORMED OF CHANGES AND FA